# Patient Record
Sex: FEMALE | Race: WHITE | Employment: FULL TIME | URBAN - METROPOLITAN AREA
[De-identification: names, ages, dates, MRNs, and addresses within clinical notes are randomized per-mention and may not be internally consistent; named-entity substitution may affect disease eponyms.]

---

## 2019-08-16 ENCOUNTER — HOSPITAL ENCOUNTER (OUTPATIENT)
Dept: LAB | Age: 29
Discharge: HOME | End: 2019-08-16
Payer: COMMERCIAL

## 2019-08-16 LAB
25(OH)D3 SERPL-MCNC: 27.6 NG/ML (ref 30–100)
ALBUMIN SERPL-MCNC: 4.6 G/DL (ref 3.4–5)
ALBUMIN/GLOB SERPL: 1.1 {RATIO} (ref 0.8–1.7)
ALP SERPL-CCNC: 68 U/L (ref 45–117)
ALT SERPL-CCNC: 21 U/L (ref 13–56)
ANION GAP SERPL CALC-SCNC: 8 MMOL/L (ref 3–18)
AST SERPL-CCNC: 12 U/L (ref 10–38)
BILIRUB SERPL-MCNC: 0.4 MG/DL (ref 0.2–1)
BUN SERPL-MCNC: 11 MG/DL (ref 7–18)
BUN/CREAT SERPL: 14 (ref 12–20)
CALCIUM SERPL-MCNC: 9.7 MG/DL (ref 8.5–10.1)
CHLORIDE SERPL-SCNC: 105 MMOL/L (ref 100–111)
CO2 SERPL-SCNC: 28 MMOL/L (ref 21–32)
CREAT SERPL-MCNC: 0.8 MG/DL (ref 0.6–1.3)
GLOBULIN SER CALC-MCNC: 4.3 G/DL (ref 2–4)
GLUCOSE SERPL-MCNC: 89 MG/DL (ref 74–99)
POTASSIUM SERPL-SCNC: 3.9 MMOL/L (ref 3.5–5.5)
PROT SERPL-MCNC: 8.9 G/DL (ref 6.4–8.2)
SODIUM SERPL-SCNC: 141 MMOL/L (ref 136–145)
T4 FREE SERPL-MCNC: 1.2 NG/DL (ref 0.7–1.5)
TSH SERPL DL<=0.05 MIU/L-ACNC: 3.64 UIU/ML (ref 0.36–3.74)

## 2019-08-16 PROCEDURE — 84439 ASSAY OF FREE THYROXINE: CPT

## 2019-08-16 PROCEDURE — 80053 COMPREHEN METABOLIC PANEL: CPT

## 2019-08-16 PROCEDURE — 36415 COLL VENOUS BLD VENIPUNCTURE: CPT

## 2019-08-16 PROCEDURE — 82306 VITAMIN D 25 HYDROXY: CPT

## 2020-12-01 ENCOUNTER — OFFICE VISIT (OUTPATIENT)
Dept: ORTHOPEDIC SURGERY | Age: 30
End: 2020-12-01
Payer: COMMERCIAL

## 2020-12-01 VITALS
BODY MASS INDEX: 25.86 KG/M2 | WEIGHT: 155.2 LBS | SYSTOLIC BLOOD PRESSURE: 142 MMHG | HEIGHT: 65 IN | HEART RATE: 87 BPM | DIASTOLIC BLOOD PRESSURE: 92 MMHG | OXYGEN SATURATION: 95 % | TEMPERATURE: 96.8 F

## 2020-12-01 DIAGNOSIS — M75.21 BICEPS TENDINITIS OF RIGHT UPPER EXTREMITY: ICD-10-CM

## 2020-12-01 DIAGNOSIS — G25.89 SCAPULAR DYSKINESIS: Primary | ICD-10-CM

## 2020-12-01 DIAGNOSIS — M25.511 ACUTE PAIN OF RIGHT SHOULDER: ICD-10-CM

## 2020-12-01 PROCEDURE — 73030 X-RAY EXAM OF SHOULDER: CPT | Performed by: ORTHOPAEDIC SURGERY

## 2020-12-01 PROCEDURE — 99203 OFFICE O/P NEW LOW 30 MIN: CPT | Performed by: ORTHOPAEDIC SURGERY

## 2020-12-01 RX ORDER — MELOXICAM 15 MG/1
15 TABLET ORAL
Qty: 30 TAB | Refills: 1 | Status: SHIPPED | OUTPATIENT
Start: 2020-12-01 | End: 2020-12-02

## 2020-12-01 RX ORDER — DEXTROAMPHETAMINE SACCHARATE, AMPHETAMINE ASPARTATE, DEXTROAMPHETAMINE SULFATE AND AMPHETAMINE SULFATE 5; 5; 5; 5 MG/1; MG/1; MG/1; MG/1
20 TABLET ORAL
COMMUNITY

## 2020-12-01 NOTE — PROGRESS NOTES
Karina Barnard  1990   Chief Complaint   Patient presents with    Shoulder Pain     right        HISTORY OF PRESENT ILLNESS  Karina Barnard is a 31 y.o. female who presents today for evaluation of right shoulder pain. She rates her pain 0/10 today. Pain has been present for around 2 weeks. Has had trouble with lifting the shoulder in the last couple of weeks, worse last week. Pt also reports a burning pain in the shoulder blade which has been present for awhile. 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 No night pain. Patient describes the pain as burning and sharp that is Intermittent in nature. Symptoms are worse with lifting and raising the arm, Activity and is better with  NSAID. Associated symptoms include nothing. Since problem started, it: is unchanged. Pain does not wake patient up at night. Has taken no meds for the problem. Has tried following treatments: Injections:NO; Brace:NO; Therapy:NO; Cane/Crutch:NO       Not on File     History reviewed. No pertinent past medical history.    Social History     Socioeconomic History    Marital status: SINGLE     Spouse name: Not on file    Number of children: Not on file    Years of education: Not on file    Highest education level: Not on file   Occupational History    Not on file   Social Needs    Financial resource strain: Not on file    Food insecurity     Worry: Not on file     Inability: Not on file    Transportation needs     Medical: Not on file     Non-medical: Not on file   Tobacco Use    Smoking status: Never Smoker    Smokeless tobacco: Never Used   Substance and Sexual Activity    Alcohol use: Not on file    Drug use: Not on file    Sexual activity: Not on file   Lifestyle    Physical activity     Days per week: Not on file     Minutes per session: Not on file    Stress: Not on file   Relationships    Social connections     Talks on phone: Not on file     Gets together: Not on file     Attends [employer]ism service: Not on file Active member of club or organization: Not on file     Attends meetings of clubs or organizations: Not on file     Relationship status: Not on file    Intimate partner violence     Fear of current or ex partner: Not on file     Emotionally abused: Not on file     Physically abused: Not on file     Forced sexual activity: Not on file   Other Topics Concern    Not on file   Social History Narrative    Not on file      History reviewed. No pertinent surgical history. History reviewed. No pertinent family history. Current Outpatient Medications   Medication Sig    dextroamphetamine-amphetamine (AdderalL) 20 mg tablet Take 20 mg by mouth. No current facility-administered medications for this visit. REVIEW OF SYSTEM   Patient denies: Weight loss, Fever/Chills, HA, Visual changes, Fatigue, Chest pain, SOB, Abdominal pain, N/V/D/C, Blood in stool or urine, Edema. Pertinent positive as above in HPI. All others were negative    PHYSICAL EXAM:   Visit Vitals  BP (!) 142/92 Comment: manual   Pulse 87   Temp 96.8 °F (36 °C) (Temporal)   Ht 5' 5\" (1.651 m)   Wt 155 lb 3.2 oz (70.4 kg)   SpO2 95%   BMI 25.83 kg/m²     The patient is a well-developed, well-nourished female   in no acute distress. The patient is alert and oriented times three. The patient is alert and oriented times three. Mood and affect are normal.  LYMPHATIC: lymph nodes are not enlarged and are within normal limits  SKIN: normal in color and non tender to palpation. There are no bruises or abrasions noted. NEUROLOGICAL: Motor sensory exam is within normal limits. Reflexes are equal bilaterally.  There is normal sensation to pinprick and light touch  MUSCULOSKELETAL:  Examination Right shoulder   Skin Intact   AC joint tenderness -   Biceps tenderness -   Forward flexion/Elevation    Active abduction    Glenohumeral abduction 80   External rotation ROM 60   Internal rotation ROM 45   Apprehension -   Jamilahs Relocation - Jerk -   Load and Shift -   Obriens -   Speeds -   Impingement sign -   Supraspinatus/Empty Can -, 5/5   External Rotation Strength -, 5/5   Lift Off/Belly Press -, 5/5   Neurovascular Intact     Examination Neck   Skin Intact   Tenderness, Paracervical +   Paracervical spasms  +   Flexion Decreased 25%   Extension Decreased 25%   Lateral bend left Normal   Lateral bend right Normal   Masses -   Spurling sign -   Biceps reflex Normal   Triceps reflex Normal   Brachioradialis reflex Normal   Sensation Normal       IMAGING: XR of right shoulder obtained in the office dated 12/01/2020 was reviewed and read by Dr. Marilu Rodriguez: No acute abnormalities       IMPRESSION:      ICD-10-CM ICD-9-CM    1. Scapular dyskinesis  G25.89 781.3 REFERRAL TO PHYSICAL THERAPY      meloxicam (Mobic) 15 mg tablet   2. Acute pain of right shoulder  M25.511 719.41 AMB POC XRAY, SHOULDER; COMPLETE, 2+   3. Biceps tendinitis of right upper extremity  M75.21 726.12 meloxicam (Mobic) 15 mg tablet        PLAN:  1. Pt presents today with right shoulder pain due to biceps tendinitis and she was given a prescription for Mobic today. She also present with symptoms c/w scapular dyskinesis and I would like for her to begin PT for the shoulder blade and neck. Will see her back in 5 weeks. Risk factors include: n/a   2. No ultrasound exam indicated today  3. No cortisone injection indicated today   4. Yes Physical/Occupational Therapy indicated today  5. No diagnostic test indicated today:   6. No durable medical equipment indicated today  7. No referral indicated today   8. Yes medications indicated today: MOBIC  9. No Narcotic indicated today      RTC 5 weeks      Scribed by Amy Alegre [address]) as dictated by Dominga Mata MD    I, Dr. Dominga Mata, confirm that all documentation is accurate.     Dominga Mata M.D.   [facility]

## 2020-12-15 ENCOUNTER — HOSPITAL ENCOUNTER (OUTPATIENT)
Dept: PHYSICAL THERAPY | Age: 30
Discharge: HOME | End: 2020-12-15
Payer: COMMERCIAL

## 2020-12-15 PROCEDURE — 97162 PT EVAL MOD COMPLEX 30 MIN: CPT

## 2020-12-15 PROCEDURE — 97110 THERAPEUTIC EXERCISES: CPT

## 2020-12-15 PROCEDURE — 97140 MANUAL THERAPY 1/> REGIONS: CPT

## 2020-12-15 NOTE — PROGRESS NOTES
[facility]  [address]  [place], [address]  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 PPO / Product Type: PPO /  Onset Date:11/15/2020    Treatment Diagnosis: Right shoulder/scapular pain   Prior Hospitalization: see medical history Provider#: [id number]   Medications: Verified on Patient summary List    Comorbidities: none   Prior Level of Function: Independent; pain chronic in nature ~5 years, recently exacerbated with episode of right UE weakness     The Plan of Care and following information is based on the information from the initial evaluation. Assessment/ key information: Patient is 31year old female with chief complaint of right scapular pain which is burning in nature and prominent around scapular borders with some tenderness through anterior shoulder. Patient with hyperlordosis and mild reduction in kyphosis and dysfunctional scapular rhythm on right. Scapulothoracic mobility is moderately impaired. Upper extremity strength is mildly limited. Patient would benefit from skilled physical therapy to improve upon the aforementioned deficits to improve quality of life. Evaluation Complexity History LOW Complexity : Zero comorbidities / personal factors that will impact the outcome / POC; Examination MEDIUM Complexity : 3 Standardized tests and measures addressing body structure, function, activity limitation and / or participation in recreation  ;Presentation MEDIUM Complexity : Evolving with changing characteristics  ; Clinical Decision Making MEDIUM Complexity : FOTO score of 26-74  Overall Complexity Rating: MEDIUM  Problem List: pain affecting function, decrease strength, impaired gait/ balance, decrease ADL/ functional abilitiies, decrease activity tolerance and decrease flexibility/ joint mobility   Treatment Plan may include any combination of the following: Therapeutic exercise, Therapeutic activities, Neuromuscular re-education, Physical agent/modality, Manual therapy, Patient education, Self Care training, Functional mobility training and Stair training  Patient / Family readiness to learn indicated by: asking questions, trying to perform skills and interest  Persons(s) to be included in education: patient (P)  Barriers to Learning/Limitations: None  Patient Goal (s): Zhanna Mares my pain  Patient Self Reported Health Status: good  Rehabilitation Potential: good    Short Term Goals: To be accomplished in 2 weeks:   1. Patient to be independent in HEP to maximize therapeutic benefit. Long Term Goals: To be accomplished in 4 weeks:   1. Patient to improve mid/low trapezius strength to 4+/5 to allow greater ease and efficiency in daily tasks including caring for children and personal ADLs. 2. Patient to demonstrate improved scapulothoracic rhythm with upper extremity elevation to allow for optimal mechanics in daily tasks. 3. Patient to report no difficulty placing weighted object onto overhead shelf to indicate subjective improvement in functional strength. 4. Patient to improve FOTO score to 77 to indicate progression towards premorbid status. Frequency / Duration: Patient to be seen 1-2 times per week for 4 weeks. Patient/ Caregiver education and instruction: Diagnosis, prognosis, self care, activity modification and exercises   [x]  Plan of care has been reviewed with CLARK Byrd, PT 12/15/2020 4:16 PM    ________________________________________________________________________    I certify that the above Therapy Services are being furnished while the patient is under my care.  I agree with the treatment plan and certify that this therapy is necessary.     Physician's Signature:____________Date:_________TIME:________    ** Signature, Date and Time must be completed for valid certification **    Please sign and return to [facility] [address]  [address]  [place], [facility] [address]  [phone] fax

## 2020-12-15 NOTE — PROGRESS NOTES
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]    SUBJECTIVE  Pain Level (0-10 scale): 4/10  Any medication changes, allergies to medications, adverse drug reactions, diagnosis change, or new procedure performed?: [x] No    [] Yes (see summary sheet for update)  Subjective functional status/changes:   [] No changes reported  Patient agreeable to initial evaluation    OBJECTIVE    21 min [x]Eval                  []Re-Eval       8 min Therapeutic Exercise:  [x] See flow sheet :   Rationale: increase ROM, increase strength and improve coordination to improve the patients ability to complete daily tasks with greater ease    10 min Manual Therapy:  Prone- T/S PA Mobs (T7-9). Left sidelying- right scapular mobilizations, DTM/TPR subscapularis/rhomboids   The manual therapy interventions were performed at a separate and distinct time from the therapeutic activities interventions. Rationale: decrease pain, increase ROM, decrease trigger points and increase postural awareness to complete ADLs with improved mechanics.     With   [] TE   [] TA   [] neuro   [] other: Patient Education: [x] Review HEP    [] Progressed/Changed HEP based on:   [] positioning   [] body mechanics   [] transfers   [] heat/ice application    [] other:      Other Objective/Functional Measures: See IE     Pain Level (0-10 scale) post treatment: 4/10    ASSESSMENT/Changes in Function: See IE    Patient will continue to benefit from skilled PT services to modify and progress therapeutic interventions, address functional mobility deficits, address ROM deficits, address strength deficits, analyze and address soft tissue restrictions, analyze and cue movement patterns, analyze and modify body mechanics/ergonomics and assess and modify postural abnormalities to attain remaining goals. [x]  See Plan of Care  []  See progress note/recertification  []  See Discharge Summary         Progress towards goals / Updated goals:     Short Term Goals: To be accomplished in 2 weeks:               1. Patient to be independent in HEP to maximize therapeutic benefit. IE: HEP issued     Long Term Goals: To be accomplished in 4 weeks:               1. Patient to improve mid/low trapezius strength to 4+/5 to allow greater ease and efficiency in daily tasks including caring for children and personal ADLs. IE: 3/5                2. Patient to demonstrate improved scapulothoracic rhythm with upper extremity elevation to allow for optimal mechanics in daily tasks. IE: Palpation reveals lag in right upward/downward rotation with upper extremity scaption               3. Patient to report no difficulty placing weighted object onto overhead shelf to indicate subjective improvement in functional strength. IE: Some difficulty               4. Patient to improve FOTO score to 77 to indicate progression towards premorbid status.    IE: 79       PLAN  []  Upgrade activities as tolerated     [x]  Continue plan of care  []  Update interventions per flow sheet       []  Discharge due to:_  []  Other:_      Erinn Murry, PT 12/15/2020  4:28 PM    Future Appointments   Date Time Provider Isreal Finn   12/22/2020  4:30 PM Veronica Salinas [facility]   12/29/2020  9:45 AM Olya Phan PT [facility]   1/5/2021  3:40 PM [facility]   1/5/2021  4:30 PM Brandon, [address] [facility]   1/12/2021  4:30 PM Brandon, [address] [facility]

## 2020-12-22 ENCOUNTER — HOSPITAL ENCOUNTER (OUTPATIENT)
Dept: PHYSICAL THERAPY | Age: 30
Discharge: HOME | End: 2020-12-22
Payer: COMMERCIAL

## 2020-12-22 PROCEDURE — 97140 MANUAL THERAPY 1/> REGIONS: CPT

## 2020-12-22 PROCEDURE — 97112 NEUROMUSCULAR REEDUCATION: CPT

## 2020-12-22 PROCEDURE — 97110 THERAPEUTIC EXERCISES: CPT

## 2020-12-22 NOTE — PROGRESS NOTES
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]    SUBJECTIVE  Pain Level (0-10 scale): 4  Any medication changes, allergies to medications, adverse drug reactions, diagnosis change, or new procedure performed?: [x] No    [] Yes (see summary sheet for update)  Subjective functional status/changes:   [] No changes reported  The pt reports she has tried her HEP with no issues    OBJECTIVE    Modality rationale: PD to improve the patients ability to    Min Type Additional Details    [] Estim:  []Unatt       []IFC  []Premod                        []Other:  []w/ice   []w/heat  Position:  Location:    [] Estim: []Att    []TENS instruct  []NMES                    []Other:  []w/US   []w/ice   []w/heat  Position:  Location:    []  Traction: [] Cervical       []Lumbar                       [] Prone          []Supine                       []Intermittent   []Continuous Lbs:  [] before manual  [] after manual    []  Ultrasound: []Continuous   [] Pulsed                           []1MHz   []3MHz W/cm2:  Location:    []  Iontophoresis with dexamethasone         Location: [] Take home patch   [] In clinic    []  Ice     []  heat  []  Ice massage  []  Laser   []  Anodyne Position:  Location:    []  Laser with stim  []  Other:  Position:  Location:    []  Vasopneumatic Device Pressure:       [] lo [] med [] hi   Temperature: [] lo [] med [] hi   [] Skin assessment post-treatment:  []intact []redness- no adverse reaction    []redness  adverse reaction:     26 min Therapeutic Exercise:  [] See flow sheet :   Rationale: increase ROM and increase strength to improve the patients ability to perform daily tasks and self care     8 min Neuromuscular Re-education:  []  See flow sheet :   Rationale: improve coordination and increase proprioception  to improve the patients ability to perform functional tasks with improved scapular mechanics    10 min Manual Therapy:  Right scap mobs, t/s PA's and rib springs grade II-III, STM right infraspinatus/teres   The manual therapy interventions were performed at a separate and distinct time from the therapeutic activities interventions. Rationale: increase ROM and increase tissue extensibility to improve ease of ADL performance        With   [] TE   [] TA   [] neuro   [] other: Patient Education: [x] Review HEP    [] Progressed/Changed HEP based on:   [] positioning   [] body mechanics   [] transfers   [] heat/ice application    [] other:      Other Objective/Functional Measures: pt demonstrates hypermobile GHJ scapular musculature compensating for stability with limited activity tolerance     Pain Level (0-10 scale) post treatment: 6    ASSESSMENT/Changes in Function: Pt demonstrates significant TrP through right infraspinatus and teres. Noted UT compensation with exercise today, pt reports some increased \"burning\" post therex. Pt would benefit from soft tissue work to improve contractile properties, discussed possible DN. Patient will continue to benefit from skilled PT services to modify and progress therapeutic interventions, address functional mobility deficits, address ROM deficits, address strength deficits, analyze and address soft tissue restrictions, analyze and cue movement patterns and analyze and modify body mechanics/ergonomics to attain remaining goals. []  See Plan of Care  []  See progress note/recertification  []  See Discharge Summary         Progress towards goals / Updated goals:  Short Term Goals: To be accomplished in 2 weeks:               1.  Patient to be independent in HEP to maximize therapeutic benefit. IE: HEP issued   Current: Met HEP compliant thus far 12/22/2020   Long Term Goals: To be accomplished in 4 weeks:               1. Patient to improve mid/low trapezius strength to 4+/5 to allow greater ease and efficiency in daily tasks including caring for children and personal ADLs. IE: 3/5                2. Patient to demonstrate improved scapulothoracic rhythm with upper extremity elevation to allow for optimal mechanics in daily tasks. IE: Palpation reveals lag in right upward/downward rotation with upper extremity scaption               3. Patient to report no difficulty placing weighted object onto overhead shelf to indicate subjective improvement in functional strength. IE: Some difficulty               4. Patient to improve FOTO score to 77 to indicate progression towards premorbid status.               IE: 79    PLAN  []  Upgrade activities as tolerated     []  Continue plan of care  []  Update interventions per flow sheet       []  Discharge due to:_  []  Other:_      Machelle Ibarra DPT, CMTPT 12/22/2020  4:38 PM    Future Appointments   Date Time Provider Isreal Huma   12/29/2020  9:45 AM Domingo De Los Santos PT [facility]   1/5/2021  3:40 PM Jamila Horn MD [facility]   1/5/2021  4:30 PM Brandon, [address] [facility]   1/12/2021  4:30 PM Brandon, [address] [facility]

## 2020-12-29 ENCOUNTER — HOSPITAL ENCOUNTER (OUTPATIENT)
Dept: PHYSICAL THERAPY | Age: 30
Discharge: HOME | End: 2020-12-29
Payer: COMMERCIAL

## 2020-12-29 PROCEDURE — 97110 THERAPEUTIC EXERCISES: CPT

## 2020-12-29 PROCEDURE — 97140 MANUAL THERAPY 1/> REGIONS: CPT

## 2020-12-29 NOTE — PROGRESS NOTES
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]    SUBJECTIVE  Pain Level (0-10 scale): 4  Any medication changes, allergies to medications, adverse drug reactions, diagnosis change, or new procedure performed?: [x] No    [] Yes (see summary sheet for update)  Subjective functional status/changes:   [x] No changes reported      OBJECTIVE      20 min Therapeutic Exercise:  [] See flow sheet :   Rationale: increase ROM and increase strength to improve the patient’s ability to perform daily activities        8 min Manual Therapy:  Grade 4 ST mobs and T/S PAs   The manual therapy interventions were performed at a separate and distinct time from the therapeutic activities interventions.  Rationale: decrease pain, increase ROM and increase tissue extensibility to perform daily activities   With   [] TE   [] TA   [] neuro   [] other: Patient Education: [x] Review HEP    [] Progressed/Changed HEP based on:   [] positioning   [] body mechanics   [] transfers   [] heat/ice application    [] other:      Other Objective/Functional Measures:      Pain Level (0-10 scale) post treatment: 0    ASSESSMENT/Changes in Function: Treatment cut short today due to patient being 17' late.  Patient is extremely hypermobile in B shoulder joints and lacks significant amounts of scapular stability.     Patient will continue to benefit from skilled PT services to modify and progress therapeutic interventions, address strength deficits, analyze and address soft tissue restrictions, analyze and cue movement patterns and assess and modify postural  abnormalities to attain remaining goals. []  See Plan of Care  []  See progress note/recertification  []  See Discharge Summary         Progress towards goals / Updated goals:  Short Term Goals: To be accomplished in 2 weeks:               1. Patient to be independent in HEP to maximize therapeutic benefit.              IE: HEP issued              Current: Met HEP compliant thus far 12/22/2020   Long Term Goals: To be accomplished in 4 weeks:               1. Patient to improve mid/low trapezius strength to 4+/5 to allow greater ease and efficiency in daily tasks including caring for children and personal ADLs.             MG: 3/5                2. Patient to demonstrate improved scapulothoracic rhythm with upper extremity elevation to allow for optimal mechanics in daily tasks.              IE: Palpation reveals lag in right upward/downward rotation with upper extremity scaption               3. Patient to report no difficulty placing weighted object onto overhead shelf to indicate subjective improvement in functional strength.               ZY: Some difficulty               4. Patient to improve FOTO score to 77 to indicate progression towards premorbid status.               IZ: 33    PLAN  []  Upgrade activities as tolerated     []  Continue plan of care  []  Update interventions per flow sheet       []  Discharge due to:_  []  Other:_      ELIAS Gupta, CMTPT 12/29/2020  8:09 AM    Future Appointments   Date Time Provider Isreal Huma   12/29/2020  9:45 [facility]   1/5/2021  3:40 PM Joya Stevens MD [facility]   1/5/2021  4:30 PM Brandon, [address] [facility]   1/12/2021  4:30 PM Brandon, [address] [facility]

## 2021-01-05 ENCOUNTER — OFFICE VISIT (OUTPATIENT)
Dept: ORTHOPEDIC SURGERY | Age: 31
End: 2021-01-05
Payer: COMMERCIAL

## 2021-01-05 ENCOUNTER — HOSPITAL ENCOUNTER (OUTPATIENT)
Dept: PHYSICAL THERAPY | Age: 31
Discharge: HOME | End: 2021-01-05
Payer: COMMERCIAL

## 2021-01-05 VITALS
TEMPERATURE: 97.5 F | BODY MASS INDEX: 26.06 KG/M2 | RESPIRATION RATE: 15 BRPM | OXYGEN SATURATION: 100 % | WEIGHT: 156.4 LBS | SYSTOLIC BLOOD PRESSURE: 140 MMHG | HEIGHT: 65 IN | DIASTOLIC BLOOD PRESSURE: 88 MMHG | HEART RATE: 78 BPM

## 2021-01-05 DIAGNOSIS — M75.21 BICEPS TENDINITIS OF RIGHT UPPER EXTREMITY: ICD-10-CM

## 2021-01-05 DIAGNOSIS — G25.89 SCAPULAR DYSKINESIS: Primary | ICD-10-CM

## 2021-01-05 PROCEDURE — 99212 OFFICE O/P EST SF 10 MIN: CPT | Performed by: ORTHOPAEDIC SURGERY

## 2021-01-05 PROCEDURE — 97140 MANUAL THERAPY 1/> REGIONS: CPT

## 2021-01-05 PROCEDURE — 97112 NEUROMUSCULAR REEDUCATION: CPT

## 2021-01-05 PROCEDURE — 97110 THERAPEUTIC EXERCISES: CPT

## 2021-01-05 NOTE — PROGRESS NOTES
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]    SUBJECTIVE  Pain Level (0-10 scale):  2  Any medication changes, allergies to medications, adverse drug reactions, diagnosis change, or new procedure performed?: [x] No    [] Yes (see summary sheet for update)  Subjective functional status/changes:   [] No changes reported  The pt reports she has been having less burning over the last week    OBJECTIVE    Modality rationale: PD to improve the patients ability to    Min Type Additional Details    [] Estim:  []Unatt       []IFC  []Premod                        []Other:  []w/ice   []w/heat  Position:  Location:    [] Estim: []Att    []TENS instruct  []NMES                    []Other:  []w/US   []w/ice   []w/heat  Position:  Location:    []  Traction: [] Cervical       []Lumbar                       [] Prone          []Supine                       []Intermittent   []Continuous Lbs:  [] before manual  [] after manual    []  Ultrasound: []Continuous   [] Pulsed                           []1MHz   []3MHz W/cm2:  Location:    []  Iontophoresis with dexamethasone         Location: [] Take home patch   [] In clinic    []  Ice     []  heat  []  Ice massage  []  Laser   []  Anodyne Position:  Location:    []  Laser with stim  []  Other:  Position:  Location:    []  Vasopneumatic Device Pressure:       [] lo [] med [] hi   Temperature: [] lo [] med [] hi   [] Skin assessment post-treatment:  []intact []redness- no adverse reaction    []redness  adverse reaction:       26 min Therapeutic Exercise:  [] See flow sheet :   Rationale: increase ROM and increase strength to improve the patients ability to perform ADLs with less pain     10 min Neuromuscular Re-education:  []  See flow sheet :   Rationale: improve coordination and increase proprioception  to improve the patients ability to perform functional tasks with improved scapular stability    8 min Manual Therapy:  T/s PA's and rib springs grade III, STM right thoracic paraspinals   The manual therapy interventions were performed at a separate and distinct time from the therapeutic activities interventions. Rationale: increase ROM and increase tissue extensibility to improve scapulothoracic mechanics            With   [] TE   [] TA   [] neuro   [] other: Patient Education: [x] Review HEP    [] Progressed/Changed HEP based on:   [] positioning   [] body mechanics   [] transfers   [] heat/ice application    [] other:      Other Objective/Functional Measures: pt presents with updated script to include dry needling, pt's insurance does not allow however     Pain Level (0-10 scale) post treatment: 1 \"tired\"    ASSESSMENT/Changes in Function: The pt reports improved burning and pain since last session. She is able to complete session with increased resistance with only fatigue reported post session. Advised to continue with HEP and will progress stability work as able    Patient will continue to benefit from skilled PT services to modify and progress therapeutic interventions, address functional mobility deficits, address ROM deficits, address strength deficits, analyze and address soft tissue restrictions, analyze and cue movement patterns and analyze and modify body mechanics/ergonomics to attain remaining goals. []  See Plan of Care  []  See progress note/recertification  []  See Discharge Summary         Progress towards goals / Updated goals:  Short Term Goals: To be accomplished in 2 weeks:               1.  Patient to be independent in HEP to maximize therapeutic benefit.              IE: HEP issued              Current: Met HEP compliant thus far 12/22/2020   Long Term Goals: To be accomplished in 4 weeks:               1. Patient to improve mid/low trapezius strength to 4+/5 to allow greater ease and efficiency in daily tasks including caring for children and personal ADLs.             XR: 3/5                2. Patient to demonstrate improved scapulothoracic rhythm with upper extremity elevation to allow for optimal mechanics in daily tasks.              IE: Palpation reveals lag in right   upward/downward rotation with upper extremity scaption   Current: improved upward rotation with s/l AROM 1/5/2021               3. Patient to report no difficulty placing weighted object onto overhead shelf to indicate subjective improvement in functional strength.               DM: Some difficulty               4. Patient to improve FOTO score to 77 to indicate progression towards premorbid status.               : 73    PLAN  []  Upgrade activities as tolerated     []  Continue plan of care  []  Update interventions per flow sheet       []  Discharge due to:_  []  Other:_      Aram Sun DPT, CMTPT 1/5/2021  4:49 PM    Future Appointments   Date Time Provider Isreal Alejoi   1/12/2021  4:30 PM [address] [place] [address]   2/2/2021  2:20 PM Kylie Dc MD [facility]

## 2021-01-05 NOTE — PROGRESS NOTES
Wan Galvan  1990   Chief Complaint   Patient presents with    Arm Pain     right arm apin        HISTORY OF PRESENT ILLNESS  Wan Galvan is a 31 y.o. female who presents today for reevaluation of right arm. Patient rates pain as 0/10 today. Pain has been present for over 1 month. 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 No night pain. Has been going to PT. She states she is a little better today. Still has burning pain in the shoulder blade but it is not bad. Patient denies any fever, chills, chest pain, shortness of breath or calf pain. The remainder of the review of systems is negative. There are no new illness or injuries to report since last seen in the office. There are no changes to medications, allergies, family or social history. PHYSICAL EXAM:   Visit Vitals  BP (!) 140/88 (BP 1 Location: Right arm, BP Patient Position: Sitting)   Pulse 78   Temp 97.5 °F (36.4 °C) (Temporal)   Resp 15   Ht 5' 5\" (1.651 m)   Wt 156 lb 6.4 oz (70.9 kg)   SpO2 100%   BMI 26.03 kg/m²     The patient is a well-developed, well-nourished female   in no acute distress. The patient is alert and oriented times three. The patient is alert and oriented times three. Mood and affect are normal.  LYMPHATIC: lymph nodes are not enlarged and are within normal limits  SKIN: normal in color and non tender to palpation. There are no bruises or abrasions noted. NEUROLOGICAL: Motor sensory exam is within normal limits. Reflexes are equal bilaterally.  There is normal sensation to pinprick and light touch  MUSCULOSKELETAL:  Examination Right shoulder   Skin Intact   AC joint tenderness -   Biceps tenderness -   Forward flexion/Elevation    Active abduction    Glenohumeral abduction 80   External rotation ROM 60   Internal rotation ROM 45   Apprehension -   Jamilahs Relocation -   Jerk -   Load and Shift -   Obriens -   Speeds -   Impingement sign -   Supraspinatus/Empty Can -, 5/5   External Rotation Strength -, 5/5   Lift Off/Belly Press -, 5/5   Neurovascular Intact      Examination Neck   Skin Intact   Tenderness, Paracervical +   Paracervical spasms  +   Flexion Decreased 25%   Extension Decreased 25%   Lateral bend left Normal   Lateral bend right Normal   Masses -   Spurling sign -   Biceps reflex Normal   Triceps reflex Normal   Brachioradialis reflex Normal   Sensation Normal        IMAGING: XR of right shoulder obtained in the office dated 12/01/2020 was reviewed and read by Dr. Ana Randhawa: No acute abnormalities       IMPRESSION:      ICD-10-CM ICD-9-CM    1. Scapular dyskinesis  G25.89 781.3 REFERRAL TO PHYSICAL THERAPY   2. Biceps tendinitis of right upper extremity  M75.21 726.12         PLAN:   1. Pt presents today with right shoulder blade pain due to scapular dyskinesis and I would like for her to continue with PT, try dry needling. Can follow up in 4 weeks as needed. 2. No ultrasound exam indicated today  3. No cortisone injection indicated today   4. Yes Physical/Occupational Therapy indicated today  5. No diagnostic test indicated today:   6. No durable medical equipment indicated today  7. No referral indicated today   8. No medications indicated today:   9. No Narcotic indicated today       RTC 4 weeks as needed      Scribed by Dior Infante [facility] [address]) as dictated by Enrique Ingram MD    I, Dr. Enrique Ingram, confirm that all documentation is accurate.     Enrique Ingram M.D.   [facility]

## 2021-01-12 ENCOUNTER — HOSPITAL ENCOUNTER (OUTPATIENT)
Dept: PHYSICAL THERAPY | Age: 31
Discharge: HOME | End: 2021-01-12
Payer: COMMERCIAL

## 2021-01-12 PROCEDURE — 97140 MANUAL THERAPY 1/> REGIONS: CPT

## 2021-01-12 PROCEDURE — 97110 THERAPEUTIC EXERCISES: CPT

## 2021-01-12 PROCEDURE — 97032 APPL MODALITY 1+ESTIM EA 15: CPT

## 2021-01-12 NOTE — PROGRESS NOTES
[facility] [address] [facility] [address] [address]  [address]  [place], [address]  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]  Payor: BLUE CROSS / Plan: [facility] PPO / Product Type: PPO /  Onset Date:11/15/2020                Treatment Diagnosis: Right shoulder/scapular pain   Prior Hospitalization: see medical history Provider#: [id number]   Medications: Verified on Patient summary List    Comorbidities: none   Prior Level of Function: Independent; pain chronic in nature ~5 years, recently exacerbated with episode of right UE weakness    Visits from Start of Care: 5    Missed Visits: 0      Established Goals:        Excellent         Good         Limited            None  [] Increased ROM   []  []  []  []  [x] Increased Strength  []  [x]  []  []  [] Increased Mobility  []  []  []  []   [x] Decreased Pain   []  [x]  []  []  [] Decreased Swelling  []  []  []  []    Key Functional Changes:   Short Term Goals: To be accomplished in 2 weeks:               1. Patient to be independent in HEP to maximize therapeutic benefit.              IE: HEP issued              Current: Met HEP compliant thus far 2020   Long Term Goals: To be accomplished in 4 weeks:               1. Patient to improve mid/low trapezius strength to 4+/5 to allow greater ease and efficiency in daily tasks including caring for children and personal ADLs.               HH: 3/               Current: progressing 4+/5 MT  4/5 LT 2021               2. Patient to demonstrate improved scapulothoracic rhythm with upper extremity elevation to allow for optimal mechanics in daily tasks.              IE: Palpation reveals lag in right   upward/downward rotation with upper extremity scaption              UQJNGOP: improved upward rotation with s/l AROM 2021               3. Patient to report no difficulty placing weighted object onto overhead shelf to indicate subjective improvement in functional strength.               LX: Some difficulty              Current: progressing- little difficulty 2021               4. Patient to improve FOTO score to 77 to indicate progression towards premorbid status.             Z              Current: near met 74 2021    Updated Goals: to be achieved in 4 weeks:  1. Patient to improve low trapezius strength to 4+/5 to allow greater ease and efficiency in daily tasks including caring for children and personal ADLs. PN: progressing 4/   2. Patient to report no difficulty placing weighted object onto overhead shelf to indicate subjective improvement in functional strength.   PN: progressing- little difficulty   3. Patient to improve FOTO score to 77 to indicate progression towards premorbid status. PN: near met 74 2021  4. Pt will report 75% improvement in \"burning\" sensation to improve neuromuscular efficiency with ADLs. PN: intermittent burning discomfort    ASSESSMENT/RECOMMENDATIONS: The pt reports some improvement with regards to constant burning through scapular region since [facility]. Added combo today to attempt improved muscle irritation and burning with good response.  She would benefit from continued PT to improve pain and scapular strength/stability    [x]Continue therapy per initial plan/protocol at a frequency of  1 x per week for 4 weeks  []Continue therapy with the following recommended changes:_____________________      _____________________________________________________________________  []Discontinue therapy progressing towards or have reached established goals  []Discontinue therapy due to lack of appreciable progress towards goals  []Discontinue therapy due to lack of attendance or compliance  []Await Physician's recommendations/decisions regarding therapy  []Other:________________________________________________________________    Thank you for this referral.    Ziyad Ruth 1/12/2021 6:46 PM  NOTE TO PHYSICIAN:  PLEASE COMPLETE THE ORDERS BELOW AND   FAX TO [facility] Physical Therapy: [phone]  If you are unable to process this request in 24 hours please contact our office: [phone]    ? I have read the above report and request that my patient continue as recommended. ? I have read the above report and request that my patient continue therapy with the following changes/special instructions:__________________________________________________________  ? I have read the above report and request that my patient be discharged from therapy.     Physicians signature: ______________________________Date: ______Time:______

## 2021-01-12 NOTE — PROGRESS NOTES
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]    SUBJECTIVE  Pain Level (0-10 scale): 4  Any medication changes, allergies to medications, adverse drug reactions, diagnosis change, or new procedure performed?: [x] No    [] Yes (see summary sheet for update)  Subjective functional status/changes:   [] No changes reported  The pt reports a bit of regression in regards to her pain the last day or so    OBJECTIVE    Modality rationale: decrease pain and increase tissue extensibility to improve the patients ability to perform ADLs with improved scapular mechanics   Min Type Additional Details    [] Estim:  []Unatt       []IFC  []Premod                        []Other:  []w/ice   []w/heat  Position:  Location:   6+2 [x] Estim: [x]Att    []TENS instruct  []NMES                    []Other:  [x]w/US   []w/ice   []w/heat  Position: prone  Location: right levator scap/MT    []  Traction: [] Cervical       []Lumbar                       [] Prone          []Supine                       []Intermittent   []Continuous Lbs:  [] before manual  [] after manual    []  Ultrasound: []Continuous   [] Pulsed                           []1MHz   []3MHz W/cm2:  Location:    []  Iontophoresis with dexamethasone         Location: [] Take home patch   [] In clinic    []  Ice     []  heat  []  Ice massage  []  Laser   []  Anodyne Position:  Location:    []  Laser with stim  []  Other:  Position:  Location:    []  Vasopneumatic Device Pressure:       [] lo [] med [] hi   Temperature: [] lo [] med [] hi   [] Skin assessment post-treatment:  []intact []redness- no adverse reaction    []redness  adverse reaction:       22 min Therapeutic Exercise:  [] See flow sheet :   Rationale: increase ROM and increase strength to improve the patients ability to perform daily tasks and self care     10 min Neuromuscular Re-education:  []  See flow sheet :   Rationale: increase strength, improve coordination and increase proprioception  to improve the patients ability to perform functional tasks with improved scapulohumeral stability    8 min Manual Therapy:  T/s PA's and rib springs grade III, STM right levator scap   The manual therapy interventions were performed at a separate and distinct time from the therapeutic activities interventions. Rationale: increase ROM and increase tissue extensibility to improve ease of ADLs            With   [] TE   [] TA   [] neuro   [] other: Patient Education: [x] Review HEP    [] Progressed/Changed HEP based on:   [] positioning   [] body mechanics   [] transfers   [] heat/ice application    [] other:      Other Objective/Functional Measures: pt with reports of provocation of burning with palpation through levator scap region     Pain Level (0-10 scale) post treatment: 2-3    ASSESSMENT/Changes in Function: The pt reports some improvement with regards to constant burning through scapular region since [facility]. Added combo today to attempt improved muscle irritation and burning with good response. She would benefit from continued PT to improve pain and scapular stability    Patient will continue to benefit from skilled PT services to modify and progress therapeutic interventions, address functional mobility deficits, address ROM deficits, address strength deficits, analyze and address soft tissue restrictions, analyze and cue movement patterns and analyze and modify body mechanics/ergonomics to attain remaining goals.      []  See Plan of Care  [x]  See progress note/recertification  []  See Discharge Summary         Progress towards goals / Updated goals:  Short Term Goals: To be accomplished in 2 weeks:               1. Patient to be independent in HEP to maximize therapeutic benefit.              IE: HEP issued              Current: Met HEP compliant thus far 12/22/2020   Long Term Goals: To be accomplished in 4 weeks:               1. Patient to improve mid/low trapezius strength to 4+/5 to allow greater ease and efficiency in daily tasks including caring for children and personal ADLs.             ES: 3/5    Current: progressing 4+/5 MT  4/5 LT 1/12/2021               2. Patient to demonstrate improved scapulothoracic rhythm with upper extremity elevation to allow for optimal mechanics in daily tasks.              IE: Palpation reveals lag in right   upward/downward rotation with upper extremity scaption              Current: improved upward rotation with s/l AROM 1/5/2021               3. Patient to report no difficulty placing weighted object onto overhead shelf to indicate subjective improvement in functional strength.               FL: Some difficulty   Current: progressing- little difficulty 1/12/2021               4. Patient to improve FOTO score to 77 to indicate progression towards premorbid status.               EV: 60   Current: near met 74 1/12/2021    PLAN  []  Upgrade activities as tolerated     [x]  Continue plan of care  []  Update interventions per flow sheet       []  Discharge due to:_  []  Other:_      Gabriel Watkins DPT CMTPT 1/12/2021  4:42 PM    Future Appointments   Date Time Provider Isreal Finn   2/2/2021  2:20 PM Neeta Trevino MD [facility]

## 2021-01-22 ENCOUNTER — HOSPITAL ENCOUNTER (OUTPATIENT)
Dept: PHYSICAL THERAPY | Age: 31
Discharge: HOME | End: 2021-01-22
Payer: COMMERCIAL

## 2021-01-22 PROCEDURE — 97140 MANUAL THERAPY 1/> REGIONS: CPT

## 2021-01-22 PROCEDURE — 97112 NEUROMUSCULAR REEDUCATION: CPT

## 2021-01-22 PROCEDURE — 97110 THERAPEUTIC EXERCISES: CPT

## 2021-01-22 NOTE — PROGRESS NOTES
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]    SUBJECTIVE  Pain Level (0-10 scale): 3  Any medication changes, allergies to medications, adverse drug reactions, diagnosis change, or new procedure performed?: [x] No    [] Yes (see summary sheet for update)  Subjective functional status/changes:   [x] No changes reported      OBJECTIVE    Modality rationale: decrease pain and increase tissue extensibility to improve the patient’s ability to perform ADLs   Min Type Additional Details    [] Estim:  []Unatt       []IFC  []Premod                        []Other:  []w/ice   []w/heat  Position:  Location:   8 [x] Estim: [x]Att    []TENS instruct  []NMES                    []Other:  [x]w/US   []w/ice   []w/heat  Position:prone  Location:right lev scap/middle trap    []  Traction: [] Cervical       []Lumbar                       [] Prone          []Supine                       []Intermittent   []Continuous Lbs:  [] before manual  [] after manual    []  Ultrasound: []Continuous   [] Pulsed                           []1MHz   []3MHz W/cm2:  Location:    []  Iontophoresis with dexamethasone         Location: [] Take home patch   [] In clinic    []  Ice     []  heat  []  Ice massage  []  Laser   []  Anodyne Position:  Location:    []  Laser with stim  []  Other:  Position:  Location:    []  Vasopneumatic Device Pressure:       [] lo [] med [] hi   Temperature: [] lo [] med [] hi   [] Skin assessment post-treatment:  []intact []redness- no adverse reaction    []redness – adverse reaction:       11 min Therapeutic Exercise:  [] See flow sheet  :   Rationale: increase strength to improve the patients ability to perform ADLs     15 min Neuromuscular Re-education:  []  See flow sheet :   Rationale: increase strength, improve coordination, improve balance and increase proprioception  to improve the patients scapular stability for ADLs    10 min Manual Therapy:  Grade 4 T/S PAs; grade 4 ST mobs   The manual therapy interventions were performed at a separate and distinct time from the therapeutic activities interventions. Rationale: decrease pain, increase ROM and increase tissue extensibility to perform ADLs  With   [] TE   [] TA   [] neuro   [] other: Patient Education: [x] Review HEP    [] Progressed/Changed HEP based on:   [] positioning   [] body mechanics   [] transfers   [] heat/ice application    [] other:      Other Objective/Functional Measures:      Pain Level (0-10 scale) post treatment: 0    ASSESSMENT/Changes in Function: Cueing to maintain neutral C/S while performing s/l shoulder exercises. MM  restrictions noted in right superomedial scap; improved with combo and manual.      Patient will continue to benefit from skilled PT services to modify and progress therapeutic interventions, address functional mobility deficits, address strength deficits, analyze and address soft tissue restrictions and analyze and cue movement patterns to attain remaining goals. []  See Plan of Care  []  See progress note/recertification  []  See Discharge Summary         Progress towards goals / Updated goals:  1. Patient to improve low trapezius strength to 4+/5 to allow greater ease and efficiency in daily tasks including caring for children and personal ADLs. PN: progressing 4/5   2. Patient to report no difficulty placing weighted object onto overhead shelf to indicate subjective improvement in functional strength.   PN: progressing- little difficulty   3. Patient to improve FOTO score to 77 to indicate progression towards premorbid status.   PN: near met 76 1/12/2021  4. Pt will report 75% improvement in \"burning\" sensation to improve neuromuscular efficiency with ADLs.   PN: intermittent burning discomfort    PLAN  []  Upgrade activities as tolerated     [x]  Continue plan of care  []  Update interventions per flow sheet       []  Discharge due to:_  []  Other:_      Santi Davila, MPT, CMTPT 1/22/2021  8:55 AM    Future Appointments   Date Time Provider [facility]   1/22/2021  3:45 PM David Pennington PT [facility]   1/26/2021  3:45 PM [id number] [email or web address] [facility]   2/2/2021  2:20 PM Alcides Mistry MD [facility]

## 2021-01-26 ENCOUNTER — APPOINTMENT (OUTPATIENT)
Dept: PHYSICAL THERAPY | Age: 31
End: 2021-01-26
Payer: COMMERCIAL

## 2021-01-27 ENCOUNTER — HOSPITAL ENCOUNTER (OUTPATIENT)
Dept: PHYSICAL THERAPY | Age: 31
Discharge: HOME | End: 2021-01-27
Payer: COMMERCIAL

## 2021-01-27 PROCEDURE — 97112 NEUROMUSCULAR REEDUCATION: CPT

## 2021-01-27 PROCEDURE — 97032 APPL MODALITY 1+ESTIM EA 15: CPT

## 2021-01-27 PROCEDURE — 97110 THERAPEUTIC EXERCISES: CPT

## 2021-01-27 PROCEDURE — 97140 MANUAL THERAPY 1/> REGIONS: CPT

## 2021-01-27 NOTE — PROGRESS NOTES
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]    SUBJECTIVE  Pain Level (0-10 scale):  2  Any medication changes, allergies to medications, adverse drug reactions, diagnosis change, or new procedure performed?: [x] No    [] Yes (see summary sheet for update)  Subjective functional status/changes:   [] No changes reported  The pt reports she still notices some discomfort at proximal scapula but not nearly as intense as previously    OBJECTIVE    Modality rationale: decrease pain and increase tissue extensibility to improve the patients ability to perform ADLs with improved scapular control   Min Type Additional Details    [] Estim:  []Unatt       []IFC  []Premod                        []Other:  []w/ice   []w/heat  Position:  Location:   6+2 [x] Estim: [x]Att    []TENS instruct  []NMES                    []Other:  [x]w/US   []w/ice   []w/heat  Position: prone  Location: right levator/MT    []  Traction: [] Cervical       []Lumbar                       [] Prone          []Supine                       []Intermittent   []Continuous Lbs:  [] before manual  [] after manual    []  Ultrasound: []Continuous   [] Pulsed                           []1MHz   []3MHz W/cm2:  Location:    []  Iontophoresis with dexamethasone         Location: [] Take home patch   [] In clinic    []  Ice     []  heat  []  Ice massage  []  Laser   []  Anodyne Position:  Location:    []  Laser with stim  []  Other:  Position:  Location:    []  Vasopneumatic Device Pressure:       [] lo [] med [] hi   Temperature: [] lo [] med [] hi   [] Skin assessment post-treatment:  []intact []redness- no adverse reaction    []redness  adverse reaction:     18 min Therapeutic Exercise:  [] See flow sheet :   Rationale: increase ROM and increase strength to improve the patients ability to perform ADLs and self care     10 min Neuromuscular Re-education:  []  See flow sheet :   Rationale: increase strength, improve coordination and increase proprioception  to improve the patients ability to perform functional tasks with improved scapular stability    8 min Manual Therapy:  T/s PAs and rib springs grade III, t/s traction   The manual therapy interventions were performed at a separate and distinct time from the therapeutic activities interventions. Rationale: increase ROM and increase tissue extensibility to improve ease of ADLs            With   [] TE   [] TA   [] neuro   [] other: Patient Education: [x] Review HEP    [] Progressed/Changed HEP based on:   [] positioning   [] body mechanics   [] transfers   [] heat/ice application    [] other:      Other Objective/Functional Measures:      Pain Level (0-10 scale) post treatment: 0    ASSESSMENT/Changes in Function: The pt has progressed well with regards to pain control and is improving with scapular strength work. She demonstrates abolishment of pain after last 2 sessions. Pt will f/u with MD on 2/2 and inform on further PT from there    Patient will continue to benefit from skilled PT services to modify and progress therapeutic interventions, address functional mobility deficits, address ROM deficits, address strength deficits, analyze and address soft tissue restrictions, analyze and cue movement patterns and analyze and modify body mechanics/ergonomics to attain remaining goals. []  See Plan of Care  []  See progress note/recertification  []  See Discharge Summary         Progress towards goals / Updated goals:  1.  Patient to improve low trapezius strength to 4+/5 to allow greater ease and efficiency in daily tasks including caring for children and personal ADLs. PN: progressing 4/5   2. Patient to report no difficulty placing weighted object onto overhead shelf to indicate subjective improvement in functional strength.   PN: progressing- little difficulty   3. Patient to improve FOTO score to 77 to indicate progression towards premorbid status. PN: near met 74 1/12/2021  4. Pt will report 75% improvement in \"burning\" sensation to improve neuromuscular efficiency with ADLs.   PN: intermittent burning discomfort  Current: progressing- reports still presence of discomfort but not as intense as before 1/27/2021    PLAN  []  Upgrade activities as tolerated     []  Continue plan of care  []  Update interventions per flow sheet       []  Discharge due to:_  []  Other:_      Jim Cervantes DPT CMTPT 1/27/2021  2:27 PM    Future Appointments   Date Time Provider Isreal Finn   2/2/2021  2:20 PM Aidan Ruiz MD [facility]

## 2021-02-02 ENCOUNTER — OFFICE VISIT (OUTPATIENT)
Dept: ORTHOPEDIC SURGERY | Age: 31
End: 2021-02-02
Payer: COMMERCIAL

## 2021-02-02 VITALS
TEMPERATURE: 96.9 F | HEART RATE: 72 BPM | RESPIRATION RATE: 16 BRPM | DIASTOLIC BLOOD PRESSURE: 86 MMHG | BODY MASS INDEX: 26.16 KG/M2 | HEIGHT: 65 IN | SYSTOLIC BLOOD PRESSURE: 135 MMHG | OXYGEN SATURATION: 100 % | WEIGHT: 157 LBS

## 2021-02-02 DIAGNOSIS — M24.111 SNAPPING SCAPULA SYNDROME OF RIGHT SHOULDER: ICD-10-CM

## 2021-02-02 DIAGNOSIS — G25.89 SCAPULAR DYSKINESIS: Primary | ICD-10-CM

## 2021-02-02 PROCEDURE — 99214 OFFICE O/P EST MOD 30 MIN: CPT | Performed by: ORTHOPAEDIC SURGERY

## 2021-02-02 PROCEDURE — 20611 DRAIN/INJ JOINT/BURSA W/US: CPT | Performed by: ORTHOPAEDIC SURGERY

## 2021-02-02 RX ORDER — TRIAMCINOLONE ACETONIDE 40 MG/ML
40 INJECTION, SUSPENSION INTRA-ARTICULAR; INTRAMUSCULAR ONCE
Status: COMPLETED | OUTPATIENT
Start: 2021-02-02 | End: 2021-02-02

## 2021-02-02 RX ADMIN — TRIAMCINOLONE ACETONIDE 40 MG: 40 INJECTION, SUSPENSION INTRA-ARTICULAR; INTRAMUSCULAR at 15:48

## 2021-02-02 NOTE — PROGRESS NOTES
Mary Mendoza  1990   Chief Complaint   Patient presents with    Shoulder Pain     right shoulder pain        HISTORY OF PRESENT ILLNESS  Mary Mendoza is a 31 y.o. female who presents today for reevaluation of right arm. Patient rates pain as 2/10 today. Pain has been present for around 2 months. 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 Has been going to PT which only provides temporary relief. Provides relief at first but pain returns within the next day. Has not been doing dry needling because her insurance does not cover it. Pain is worse in the afternoon and at night. Patient denies any fever, chills, chest pain, shortness of breath or calf pain. The remainder of the review of systems is negative. There are no new illness or injuries to report since last seen in the office. There are no changes to medications, allergies, family or social history. Pain Assessment  2/2/2021   Location of Pain Shoulder   Location Modifiers Right   Severity of Pain 2   Quality of Pain Aching   Duration of Pain Persistent   Frequency of Pain Constant   Date Pain First Started -   Aggravating Factors -   Aggravating Factors Comment -   Limiting Behavior No   Relieving Factors Nothing;Rest   Relieving Factors Comment laying flat on the back   Result of Injury No       PHYSICAL EXAM:   Visit Vitals  /86 (BP 1 Location: Left upper arm, BP Patient Position: Sitting)   Pulse 72   Temp 96.9 °F (36.1 °C) (Temporal)   Resp 16   Ht 5' 5\" (1.651 m)   Wt 157 lb (71.2 kg)   SpO2 100%   BMI 26.13 kg/m²     The patient is a well-developed, well-nourished female   in no acute distress. The patient is alert and oriented times three. The patient is alert and oriented times three. Mood and affect are normal.  LYMPHATIC: lymph nodes are not enlarged and are within normal limits  SKIN: normal in color and non tender to palpation. There are no bruises or abrasions noted. NEUROLOGICAL: Motor sensory exam is within normal limits. Reflexes are equal bilaterally. There is normal sensation to pinprick and light touch  MUSCULOSKELETAL:  Examination Right shoulder   Skin Intact   AC joint tenderness -   Biceps tenderness -   Forward flexion/Elevation    Active abduction    Glenohumeral abduction 80   External rotation ROM 60   Internal rotation ROM 45   Apprehension -   Jamilahs Relocation -   Jerk -   Load and Shift -   Obriens -   Speeds -   Impingement sign -   Supraspinatus/Empty Can -, 5/5   External Rotation Strength -, 5/5   Lift Off/Belly Press -, 5/5   Neurovascular Intact      Examination Neck   Skin Intact   Tenderness, Paracervical +   Paracervical spasms  +   Flexion Decreased 25%   Extension Decreased 25%   Lateral bend left Normal   Lateral bend right Normal   Masses -   Spurling sign -   Biceps reflex Normal   Triceps reflex Normal   Brachioradialis reflex Normal   Sensation Normal   Tender over suerioior medial spine of the scapulae   PROCEDURE: Right Scapula Injection with Ultrasound Guidance  Indication:Right Scapula pain/swelling    After sterile prep, 1 cc of Xylocaine and 1 cc of Kenalog were injected into the right scapula. Ultrasound images captured using [address] Ultrasound machine and scanned into patient's chart.        [facility] - [facility]  OFFICE PROCEDURE PROGRESS NOTE        Chart reviewed for the following:  Casandra Booker M.D, have reviewed the History, Physical and updated the Allergic reactions for [address] performed immediately prior to start of procedure:  Casandra Booker M.D, have performed the following reviews on [facility] prior to the start of the procedure:            * Patient was identified by name and date of birth   * Agreement on procedure being performed was verified  * Risks and Benefits explained to the patient  * Procedure site verified and marked as necessary  * Patient was positioned for comfort  * Consent was signed and verified     Time: 2:52 PM     Date of procedure: 2/2/2021    Procedure performed by:  Mary Jo Junior M.D    Provider assisted by: (see medication administration)    How tolerated by patient: tolerated the procedure well with no complications    Comments: none      IMAGING: XR of right shoulder obtained in the office dated 12/01/2020 was reviewed and read by Dr. Yashira Garza: No acute abnormalities       IMPRESSION:      ICD-10-CM ICD-9-CM    1. Scapular dyskinesis  G25.89 781.3 triamcinolone acetonide (KENALOG-40) 40 mg/mL injection 40 mg      ARTHROCENTESIS ASPIR&/INJ MAJOR JT/BURSA W/US   2. Snapping scapula syndrome of right shoulder  M24.111 733.99 triamcinolone acetonide (KENALOG-40) 40 mg/mL injection 40 mg      ARTHROCENTESIS ASPIR&/INJ MAJOR JT/BURSA W/US        PLAN:   1. Pt presents today with persistent right shoulder blade pain despite going to PT and I would like to try a cortisone injection in the scapula today. Will see her back in 3 weeks. 2. No ultrasound exam indicated today  3. Yes cortisone injection indicated today R SCAPULA US  4. No Physical/Occupational Therapy indicated today  5. No diagnostic test indicated today:   6. No durable medical equipment indicated today  7. No referral indicated today   8. No medications indicated today:   9. No Narcotic indicated today       RTC 3 weeks       Scribed by Edward Renee [address]) as dictated by Mary Jo Junior MD    I, Dr. Mary Jo Junior, confirm that all documentation is accurate.     Mary Jo Junior M.D.   [facility]

## 2021-02-25 NOTE — PROGRESS NOTES
[facility]  [address]  [place], [address]  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 Patient to improve low trapezius strength to 4+/5 to allow greater ease and efficiency in daily tasks including caring for children and personal ADLs. PN: progressing 4/5   2. Patient to report no difficulty placing weighted object onto overhead shelf to indicate subjective improvement in functional strength.   PN: progressing- little difficulty   3. Patient to improve FOTO score to 77 to indicate progression towards premorbid status. PN: near met 74 1/12/2021  4. Pt will report 75% improvement in \"burning\" sensation to improve neuromuscular efficiency with ADLs. PN: intermittent burning discomfort  Current: progressing- reports still presence of discomfort but not as intense as before 1/27/2021    ASSESSMENT/RECOMMENDATIONS: The pt did make some progress regarding pain. She was progressing with scapular strength.  Pt requested D/C following Cortisone injection from MD. Unable to further assess patient    [x]Discontinue therapy: []Patient has reached or is progressing toward set goals      [x]Patient requested D/C      []Due to lack of appreciable progress towards set goals    Crow PLAZAT, CMTPT 2/25/2021 1:38 PM

## 2022-02-24 DIAGNOSIS — M75.21 BICEPS TENDINITIS OF RIGHT UPPER EXTREMITY: ICD-10-CM

## 2022-02-24 DIAGNOSIS — G25.89 SCAPULAR DYSKINESIS: ICD-10-CM

## 2022-02-24 RX ORDER — MELOXICAM 15 MG/1
TABLET ORAL
Qty: 90 TABLET | Refills: 2 | Status: SHIPPED | OUTPATIENT
Start: 2022-02-24